# Patient Record
Sex: FEMALE | Race: BLACK OR AFRICAN AMERICAN | NOT HISPANIC OR LATINO | URBAN - METROPOLITAN AREA
[De-identification: names, ages, dates, MRNs, and addresses within clinical notes are randomized per-mention and may not be internally consistent; named-entity substitution may affect disease eponyms.]

---

## 2021-10-13 ENCOUNTER — TELEPHONE (OUTPATIENT)
Dept: SCHEDULING | Facility: IMAGING CENTER | Age: 26
End: 2021-10-13

## 2021-10-20 ENCOUNTER — OFFICE VISIT (OUTPATIENT)
Dept: MEDICAL GROUP | Facility: CLINIC | Age: 26
End: 2021-10-20
Payer: MEDICAID

## 2021-10-20 VITALS
RESPIRATION RATE: 16 BRPM | HEIGHT: 69 IN | WEIGHT: 264 LBS | SYSTOLIC BLOOD PRESSURE: 126 MMHG | DIASTOLIC BLOOD PRESSURE: 85 MMHG | OXYGEN SATURATION: 96 % | HEART RATE: 87 BPM | TEMPERATURE: 97.2 F | BODY MASS INDEX: 39.1 KG/M2

## 2021-10-20 DIAGNOSIS — K58.8 OTHER IRRITABLE BOWEL SYNDROME: ICD-10-CM

## 2021-10-20 DIAGNOSIS — E05.90 HYPERTHYROIDISM: ICD-10-CM

## 2021-10-20 DIAGNOSIS — E03.8 OTHER SPECIFIED HYPOTHYROIDISM: ICD-10-CM

## 2021-10-20 PROCEDURE — 99204 OFFICE O/P NEW MOD 45 MIN: CPT | Performed by: STUDENT IN AN ORGANIZED HEALTH CARE EDUCATION/TRAINING PROGRAM

## 2021-10-20 ASSESSMENT — PAIN SCALES - GENERAL: PAINLEVEL: NO PAIN

## 2021-10-20 ASSESSMENT — PATIENT HEALTH QUESTIONNAIRE - PHQ9: CLINICAL INTERPRETATION OF PHQ2 SCORE: 0

## 2021-10-20 NOTE — ASSESSMENT & PLAN NOTE
IBD work-up started including lab work  Referral to GI for likely colonoscopy  Discussed with poison control about the hydrogen peroxide and nothing to do at this point they were not concerned about possible ulceration or bowel inflammation following ingestion as it usually induces vomiting  Follow-up in 2 weeks

## 2021-10-20 NOTE — PROGRESS NOTES
"Subjective:     CC: multiple concerns    HPI:   Dianelys presents today with     Problem   Other Irritable Bowel Syndrome    Patient with multiple episodes of diarrhea for the past 3 years.  This followed an episode where she \"hydrogen peroxide and probably swallowed some of.  She cannot pinpoint any foods that make this better or worse.  She has tried eliminating some foods in the past but without any real relief.  She got some relief from medication but she cannot remember what it was called.  She has no history of associated rashes, other autoimmune disease, fevers, chills, muscle aches or pains.  She has no joint pains or swellings.  She has no weight loss or gain.  No history of autoimmune disease in the family.  She has no blood in the stool.     Bmi 39.0-39.9,Adult    Patient with steady weight for the last number of years.  She does not have a consistent bed partner and is unaware of potential for sleep apnea.  She does not seem to wake up with morning headaches.  She reports her diet is largely consistent of sugar but does include some healthy foods. She has been at this weight for years and is requesting discussion of bariatric surgery.      Other Specified Hypothyroidism    Patient with history of hypothyroidism diagnosed in [place].  She does not have records currently.  She takes her medication infrequently though does get a couple doses per week.  She has not had recent lab work that she is aware of.  She has no acute changes in weight, cold or heat intolerance, changes to skin or hair.  She has no palpitations or chest pain.         No current [facility]-ordered outpatient medications on file.     No current [facility]-ordered facility-administered medications on file.       ROS:  Gen: no fevers/chills, no changes in weight  Eyes: no changes in vision  ENT: no sore throat, no hearing loss, no bloody nose  Pulm: no SOB, no cough  CV: no chest pain, no palpitations  GI: no nausea/vomiting, no diarrhea  : no " "dysuria  MSk: no myalgias  Skin: no rash  Neuro: no headaches, no numbness/tingling  Heme/Lymph: no easy bruising      Objective:     Exam:  /85 (BP Location: Left arm, Patient Position: Sitting, BP Cuff Size: Large adult)   Pulse 87   Temp 36.2 °C (97.2 °F) (Tympanic)   Resp 16   Ht 1.74 m (5' 8.5\")   Wt 120 kg (264 lb)   SpO2 96%   BMI 39.56 kg/m²  Body mass index is 39.56 kg/m².    Gen: Alert and oriented, No apparent distress.  HEENT: PERRL, EOMI, external ears normal bilat, nose roughly midline, atraumatic, normocephalic  Neck: Neck is supple without lymphadenopathy.  Lungs: CTA bilaterally, no wheezes, rhonchi, or rales, symmetric chest rise  CV: Regular rate and rhythm. No murmurs, rubs, or gallops.  GI:       No rebound, no guarding, normal bowel sounds  :      No CVA tenderness, bladder non-tender to palp  Ext: No clubbing, cyanosis, edema.  Neuro: Gait appropriate for age, no focal deficits  Psych: Affect and mood congruent without abnormality  Skin:    No rashes, no jaundice      Labs:     Assessment & Plan:     26 y.o. female with the following -     Problem List Items Addressed This Visit     Other irritable bowel syndrome     IBD work-up started including lab work  Referral to GI for likely colonoscopy  Discussed with poison control about the hydrogen peroxide and nothing to do at this point they were not concerned about possible ulceration or bowel inflammation following ingestion as it usually induces vomiting  Follow-up in 2 weeks         Relevant Orders    T-TRANSGLUTAMINASE (TTG) IGA    CRP QUANTITIVE (NON-CARDIAC)    Sed Rate    REFERRAL TO GASTROENTEROLOGY    CBC WITH DIFFERENTIAL    Comp Metabolic Panel    IRON/TOTAL IRON BIND    VITAMIN B12    BMI 39.0-39.9,adult     A1c ordered today  Sleep apnea polysomnography ordered given possible for CHATO with thickened neck and BMI  If any comorbidities including diabetes, CHATO, BMI rises above 40 than patient would qualify for bariatric " surgery consult which we discussed  She will follow up in 2 weeks         Relevant Orders    Polysomnography, 4 or More    HEMOGLOBIN A1C    Other specified hypothyroidism     TSH ordered today  Patient will follow up in 2 weeks  She will give us the dose of medication currently and we will adjust as needed based on her TSH and symptoms.           Other Visit Diagnoses     Hyperthyroidism        Relevant Orders    TSH WITH REFLEX TO FT4          I spent a total of 45 minutes with record review, exam, communication with the patient, communication with other providers, and documentation of this encounter.      Return in about 2 weeks (around 11/3/2021).    Please note that this dictation was created using voice recognition software. I have made every reasonable attempt to correct obvious errors, but I expect that there are errors of grammar and possibly content that I did not discover before finalizing the note.

## 2021-10-20 NOTE — ASSESSMENT & PLAN NOTE
A1c ordered today  Sleep apnea polysomnography ordered given possible for CHATO with thickened neck and BMI  If any comorbidities including diabetes, CHATO, BMI rises above 40 than patient would qualify for bariatric surgery consult which we discussed  She will follow up in 2 weeks

## 2021-10-20 NOTE — ASSESSMENT & PLAN NOTE
TSH ordered today  Patient will follow up in 2 weeks  She will give us the dose of medication currently and we will adjust as needed based on her TSH and symptoms.